# Patient Record
Sex: MALE | Race: WHITE | Employment: OTHER | URBAN - METROPOLITAN AREA
[De-identification: names, ages, dates, MRNs, and addresses within clinical notes are randomized per-mention and may not be internally consistent; named-entity substitution may affect disease eponyms.]

---

## 2019-09-13 PROBLEM — L30.8 OTHER ECZEMA: Status: ACTIVE | Noted: 2019-09-13

## 2020-10-10 ENCOUNTER — APPOINTMENT (OUTPATIENT)
Dept: GENERAL RADIOLOGY | Age: 60
End: 2020-10-10
Attending: NURSE PRACTITIONER
Payer: COMMERCIAL

## 2020-10-10 ENCOUNTER — HOSPITAL ENCOUNTER (OUTPATIENT)
Age: 60
Discharge: HOME | End: 2020-10-10
Payer: COMMERCIAL

## 2020-10-10 VITALS
OXYGEN SATURATION: 98 % | BODY MASS INDEX: 29 KG/M2 | RESPIRATION RATE: 20 BRPM | DIASTOLIC BLOOD PRESSURE: 78 MMHG | WEIGHT: 185 LBS | SYSTOLIC BLOOD PRESSURE: 135 MMHG | HEART RATE: 51 BPM | TEMPERATURE: 97 F

## 2020-10-10 DIAGNOSIS — J90 PLEURAL EFFUSION: Primary | ICD-10-CM

## 2020-10-10 PROCEDURE — 99203 OFFICE O/P NEW LOW 30 MIN: CPT | Performed by: NURSE PRACTITIONER

## 2020-10-10 PROCEDURE — 71046 X-RAY EXAM CHEST 2 VIEWS: CPT | Performed by: NURSE PRACTITIONER

## 2020-10-10 RX ORDER — LEVOFLOXACIN 750 MG/1
750 TABLET ORAL DAILY
Qty: 5 TABLET | Refills: 0 | Status: SHIPPED | OUTPATIENT
Start: 2020-10-10 | End: 2020-10-15

## 2020-10-10 NOTE — ED PROVIDER NOTES
Patient Seen in: [facility]    History   CC: cough  HPI: Enrique Freedman 61year old male w/ hx Afib on Eliquis, HTN and hyperlipidemia who presents c/o dry cough, worse at night x10 days. +mild runny nose, congestion.  + Tab,  Take 1 tablet (12.5 mg total) by mouth daily. carvedilol 3.125 MG Oral Tab,  Take 1 tablet (3.125 mg total) by mouth 2 (two) times daily with meals.  Per cardiologist.   Propafenone HCl  MG Oral Capsule SR 12 Hr,  Take 1 capsule (425 mg total) appropriate      ED Course   Labs Reviewed - No data to display    MDM     XR CHEST PA + LAT CHEST (CPT=71046) (Final result)  Result time 10/10/20 14:10:07  Final result by Don Champagne MD (10/10/20 14:10:07)                Impression:    CONCLUSION: [place] [id number]  [phone]    Schedule an appointment as soon as possible for a visit in 2 days        Medications Prescribed:  Discharge Medication List as of 10/10/2020  2:30 PM    START taking these medications    levofloxacin 750 MG Oral Tab  Take 1 tablet (7